# Patient Record
Sex: MALE | Employment: FULL TIME | URBAN - METROPOLITAN AREA
[De-identification: names, ages, dates, MRNs, and addresses within clinical notes are randomized per-mention and may not be internally consistent; named-entity substitution may affect disease eponyms.]

---

## 2022-07-28 ENCOUNTER — HOSPITAL ENCOUNTER (OUTPATIENT)
Dept: MRI IMAGING | Age: 67
Discharge: HOME | End: 2022-07-30
Payer: COMMERCIAL

## 2022-07-28 DIAGNOSIS — M75.40 SUBACROMIAL IMPINGEMENT, UNSPECIFIED LATERALITY: ICD-10-CM

## 2022-07-28 PROCEDURE — 73221 MRI JOINT UPR EXTREM W/O DYE: CPT

## 2023-09-08 ENCOUNTER — APPOINTMENT (OUTPATIENT)
Dept: PRIMARY CARE | Facility: CLINIC | Age: 68
End: 2023-09-08
Payer: MEDICARE

## 2023-10-13 ENCOUNTER — APPOINTMENT (OUTPATIENT)
Dept: RADIOLOGY | Facility: HOSPITAL | Age: 68
End: 2023-10-13
Payer: MEDICARE

## 2023-10-13 ENCOUNTER — HOSPITAL ENCOUNTER (EMERGENCY)
Facility: HOSPITAL | Age: 68
Discharge: HOME | End: 2023-10-13
Attending: EMERGENCY MEDICINE
Payer: MEDICARE

## 2023-10-13 ENCOUNTER — OFFICE VISIT (OUTPATIENT)
Dept: PRIMARY CARE | Facility: CLINIC | Age: 68
End: 2023-10-13
Payer: MEDICARE

## 2023-10-13 VITALS
TEMPERATURE: 97 F | WEIGHT: 196 LBS | SYSTOLIC BLOOD PRESSURE: 142 MMHG | RESPIRATION RATE: 18 BRPM | BODY MASS INDEX: 22.68 KG/M2 | OXYGEN SATURATION: 98 % | HEIGHT: 78 IN | HEART RATE: 78 BPM | DIASTOLIC BLOOD PRESSURE: 86 MMHG

## 2023-10-13 VITALS
WEIGHT: 196 LBS | DIASTOLIC BLOOD PRESSURE: 50 MMHG | BODY MASS INDEX: 22.68 KG/M2 | TEMPERATURE: 96.6 F | HEART RATE: 66 BPM | HEIGHT: 78 IN | OXYGEN SATURATION: 98 % | SYSTOLIC BLOOD PRESSURE: 120 MMHG | RESPIRATION RATE: 18 BRPM

## 2023-10-13 DIAGNOSIS — K62.5 BRIGHT RED BLOOD PER RECTUM: Primary | ICD-10-CM

## 2023-10-13 DIAGNOSIS — I71.40 ABDOMINAL AORTIC ANEURYSM (AAA) WITHOUT RUPTURE, UNSPECIFIED PART (CMS-HCC): ICD-10-CM

## 2023-10-13 DIAGNOSIS — Z12.12 SCREENING FOR COLORECTAL CANCER: ICD-10-CM

## 2023-10-13 DIAGNOSIS — Z00.00 ROUTINE GENERAL MEDICAL EXAMINATION AT HEALTH CARE FACILITY: Primary | ICD-10-CM

## 2023-10-13 DIAGNOSIS — Z12.11 SCREENING FOR COLORECTAL CANCER: ICD-10-CM

## 2023-10-13 DIAGNOSIS — Z12.5 SCREENING FOR PROSTATE CANCER: ICD-10-CM

## 2023-10-13 DIAGNOSIS — K62.5 GASTROINTESTINAL HEMORRHAGE ASSOCIATED WITH ANORECTAL SOURCE: ICD-10-CM

## 2023-10-13 DIAGNOSIS — I10 ESSENTIAL HYPERTENSION: ICD-10-CM

## 2023-10-13 PROBLEM — K27.9 PEPTIC ULCER: Status: ACTIVE | Noted: 2023-10-13

## 2023-10-13 PROBLEM — G43.909 MIGRAINE HEADACHE: Status: ACTIVE | Noted: 2023-10-13

## 2023-10-13 PROBLEM — H43.811 POSTERIOR VITREOUS DETACHMENT OF RIGHT EYE: Status: ACTIVE | Noted: 2017-02-24

## 2023-10-13 PROBLEM — H25.10 CATARACT, NUCLEAR SCLEROTIC SENILE: Status: ACTIVE | Noted: 2017-02-24

## 2023-10-13 PROBLEM — K08.409 H/O WISDOM TOOTH EXTRACTION: Status: ACTIVE | Noted: 2023-10-13

## 2023-10-13 PROBLEM — M25.562 KNEE PAIN, LEFT: Status: ACTIVE | Noted: 2023-10-13

## 2023-10-13 PROBLEM — K21.9 GASTROESOPHAGEAL REFLUX DISEASE: Status: ACTIVE | Noted: 2023-10-13

## 2023-10-13 LAB
ALBUMIN SERPL BCP-MCNC: 4.2 G/DL (ref 3.4–5)
ALP SERPL-CCNC: 36 U/L (ref 33–136)
ALT SERPL W P-5'-P-CCNC: 14 U/L (ref 10–52)
ANION GAP SERPL CALC-SCNC: 9 MMOL/L (ref 10–20)
APPEARANCE UR: CLEAR
APTT PPP: 28 SECONDS (ref 27–38)
AST SERPL W P-5'-P-CCNC: 15 U/L (ref 9–39)
BASOPHILS # BLD AUTO: 0.02 X10*3/UL (ref 0–0.1)
BASOPHILS NFR BLD AUTO: 0.4 %
BILIRUB SERPL-MCNC: 1.9 MG/DL (ref 0–1.2)
BILIRUB UR STRIP.AUTO-MCNC: NEGATIVE MG/DL
BUN SERPL-MCNC: 22 MG/DL (ref 6–23)
CALCIUM SERPL-MCNC: 9.5 MG/DL (ref 8.6–10.3)
CARDIAC TROPONIN I PNL SERPL HS: 3 NG/L (ref 0–20)
CHLORIDE SERPL-SCNC: 102 MMOL/L (ref 98–107)
CO2 SERPL-SCNC: 30 MMOL/L (ref 21–32)
COLOR UR: YELLOW
CREAT SERPL-MCNC: 1 MG/DL (ref 0.5–1.3)
EOSINOPHIL # BLD AUTO: 0.02 X10*3/UL (ref 0–0.7)
EOSINOPHIL NFR BLD AUTO: 0.4 %
ERYTHROCYTE [DISTWIDTH] IN BLOOD BY AUTOMATED COUNT: 12.5 % (ref 11.5–14.5)
GFR SERPL CREATININE-BSD FRML MDRD: 82 ML/MIN/1.73M*2
GLUCOSE SERPL-MCNC: 111 MG/DL (ref 74–99)
GLUCOSE UR STRIP.AUTO-MCNC: NEGATIVE MG/DL
HCT VFR BLD AUTO: 40.8 % (ref 41–52)
HGB BLD-MCNC: 13.9 G/DL (ref 13.5–17.5)
HOLD SPECIMEN: NORMAL
HOLD SPECIMEN: NORMAL
IMM GRANULOCYTES # BLD AUTO: 0.01 X10*3/UL (ref 0–0.7)
IMM GRANULOCYTES NFR BLD AUTO: 0.2 % (ref 0–0.9)
INR PPP: 1.1 (ref 0.9–1.1)
KETONES UR STRIP.AUTO-MCNC: NEGATIVE MG/DL
LACTATE SERPL-SCNC: 1 MMOL/L (ref 0.4–2)
LEUKOCYTE ESTERASE UR QL STRIP.AUTO: NEGATIVE
LIPASE SERPL-CCNC: 24 U/L (ref 9–82)
LYMPHOCYTES # BLD AUTO: 0.64 X10*3/UL (ref 1.2–4.8)
LYMPHOCYTES NFR BLD AUTO: 11.3 %
MCH RBC QN AUTO: 31 PG (ref 26–34)
MCHC RBC AUTO-ENTMCNC: 34.1 G/DL (ref 32–36)
MCV RBC AUTO: 91 FL (ref 80–100)
MONOCYTES # BLD AUTO: 0.42 X10*3/UL (ref 0.1–1)
MONOCYTES NFR BLD AUTO: 7.4 %
NEUTROPHILS # BLD AUTO: 4.54 X10*3/UL (ref 1.2–7.7)
NEUTROPHILS NFR BLD AUTO: 80.3 %
NITRITE UR QL STRIP.AUTO: NEGATIVE
NRBC BLD-RTO: 0 /100 WBCS (ref 0–0)
PH UR STRIP.AUTO: 6 [PH]
PLATELET # BLD AUTO: 203 X10*3/UL (ref 150–450)
PMV BLD AUTO: 8.8 FL (ref 7.5–11.5)
POTASSIUM SERPL-SCNC: 4.2 MMOL/L (ref 3.5–5.3)
PROT SERPL-MCNC: 6.6 G/DL (ref 6.4–8.2)
PROT UR STRIP.AUTO-MCNC: NEGATIVE MG/DL
PROTHROMBIN TIME: 12.5 SECONDS (ref 9.8–12.8)
RBC # BLD AUTO: 4.49 X10*6/UL (ref 4.5–5.9)
RBC # UR STRIP.AUTO: NEGATIVE /UL
SODIUM SERPL-SCNC: 137 MMOL/L (ref 136–145)
SP GR UR STRIP.AUTO: 1.02
UROBILINOGEN UR STRIP.AUTO-MCNC: <2 MG/DL
WBC # BLD AUTO: 5.7 X10*3/UL (ref 4.4–11.3)

## 2023-10-13 PROCEDURE — 85025 COMPLETE CBC W/AUTO DIFF WBC: CPT | Performed by: EMERGENCY MEDICINE

## 2023-10-13 PROCEDURE — 74177 CT ABD & PELVIS W/CONTRAST: CPT | Mod: ME

## 2023-10-13 PROCEDURE — G0402 INITIAL PREVENTIVE EXAM: HCPCS | Performed by: FAMILY MEDICINE

## 2023-10-13 PROCEDURE — 83605 ASSAY OF LACTIC ACID: CPT | Performed by: EMERGENCY MEDICINE

## 2023-10-13 PROCEDURE — 3074F SYST BP LT 130 MM HG: CPT | Performed by: FAMILY MEDICINE

## 2023-10-13 PROCEDURE — 96361 HYDRATE IV INFUSION ADD-ON: CPT

## 2023-10-13 PROCEDURE — 99284 EMERGENCY DEPT VISIT MOD MDM: CPT | Mod: 25

## 2023-10-13 PROCEDURE — 2500000004 HC RX 250 GENERAL PHARMACY W/ HCPCS (ALT 636 FOR OP/ED): Performed by: EMERGENCY MEDICINE

## 2023-10-13 PROCEDURE — 80053 COMPREHEN METABOLIC PANEL: CPT | Performed by: EMERGENCY MEDICINE

## 2023-10-13 PROCEDURE — 1036F TOBACCO NON-USER: CPT | Performed by: FAMILY MEDICINE

## 2023-10-13 PROCEDURE — 71045 X-RAY EXAM CHEST 1 VIEW: CPT | Mod: FY

## 2023-10-13 PROCEDURE — 96360 HYDRATION IV INFUSION INIT: CPT

## 2023-10-13 PROCEDURE — 71045 X-RAY EXAM CHEST 1 VIEW: CPT | Performed by: RADIOLOGY

## 2023-10-13 PROCEDURE — 1125F AMNT PAIN NOTED PAIN PRSNT: CPT | Performed by: FAMILY MEDICINE

## 2023-10-13 PROCEDURE — 85730 THROMBOPLASTIN TIME PARTIAL: CPT | Performed by: EMERGENCY MEDICINE

## 2023-10-13 PROCEDURE — 99214 OFFICE O/P EST MOD 30 MIN: CPT | Performed by: FAMILY MEDICINE

## 2023-10-13 PROCEDURE — 1170F FXNL STATUS ASSESSED: CPT | Performed by: FAMILY MEDICINE

## 2023-10-13 PROCEDURE — 85610 PROTHROMBIN TIME: CPT | Performed by: EMERGENCY MEDICINE

## 2023-10-13 PROCEDURE — 2550000001 HC RX 255 CONTRASTS: Performed by: EMERGENCY MEDICINE

## 2023-10-13 PROCEDURE — 81003 URINALYSIS AUTO W/O SCOPE: CPT | Performed by: EMERGENCY MEDICINE

## 2023-10-13 PROCEDURE — 1159F MED LIST DOCD IN RCRD: CPT | Performed by: FAMILY MEDICINE

## 2023-10-13 PROCEDURE — 83690 ASSAY OF LIPASE: CPT | Performed by: EMERGENCY MEDICINE

## 2023-10-13 PROCEDURE — 36415 COLL VENOUS BLD VENIPUNCTURE: CPT | Performed by: EMERGENCY MEDICINE

## 2023-10-13 PROCEDURE — 1160F RVW MEDS BY RX/DR IN RCRD: CPT | Performed by: FAMILY MEDICINE

## 2023-10-13 PROCEDURE — 74177 CT ABD & PELVIS W/CONTRAST: CPT | Performed by: RADIOLOGY

## 2023-10-13 PROCEDURE — 3078F DIAST BP <80 MM HG: CPT | Performed by: FAMILY MEDICINE

## 2023-10-13 PROCEDURE — 84484 ASSAY OF TROPONIN QUANT: CPT | Performed by: EMERGENCY MEDICINE

## 2023-10-13 RX ORDER — BISOPROLOL FUMARATE AND HYDROCHLOROTHIAZIDE 2.5; 6.25 MG/1; MG/1
1 TABLET ORAL DAILY
Qty: 90 TABLET | Refills: 3 | Status: SHIPPED | OUTPATIENT
Start: 2023-10-13 | End: 2024-02-13 | Stop reason: SDUPTHER

## 2023-10-13 RX ORDER — BISOPROLOL FUMARATE AND HYDROCHLOROTHIAZIDE 2.5; 6.25 MG/1; MG/1
1 TABLET ORAL DAILY
COMMUNITY
End: 2023-10-13 | Stop reason: SDUPTHER

## 2023-10-13 RX ORDER — OMEPRAZOLE 20 MG/1
20 CAPSULE, DELAYED RELEASE ORAL DAILY
COMMUNITY

## 2023-10-13 RX ADMIN — IOHEXOL 75 ML: 350 INJECTION, SOLUTION INTRAVENOUS at 14:15

## 2023-10-13 RX ADMIN — SODIUM CHLORIDE 500 ML: 9 INJECTION, SOLUTION INTRAVENOUS at 12:24

## 2023-10-13 ASSESSMENT — PATIENT HEALTH QUESTIONNAIRE - PHQ9
2. FEELING DOWN, DEPRESSED OR HOPELESS: NOT AT ALL
1. LITTLE INTEREST OR PLEASURE IN DOING THINGS: NOT AT ALL
SUM OF ALL RESPONSES TO PHQ9 QUESTIONS 1 AND 2: 0
SUM OF ALL RESPONSES TO PHQ9 QUESTIONS 1 AND 2: 0
2. FEELING DOWN, DEPRESSED OR HOPELESS: NOT AT ALL
1. LITTLE INTEREST OR PLEASURE IN DOING THINGS: NOT AT ALL

## 2023-10-13 ASSESSMENT — ENCOUNTER SYMPTOMS
FATIGUE: 1
NECK PAIN: 0
HEADACHES: 0
ARTHRALGIAS: 0
SWOLLEN GLANDS: 0
NAUSEA: 0
CHILLS: 0
DEPRESSION: 0
LOSS OF SENSATION IN FEET: 0
OCCASIONAL FEELINGS OF UNSTEADINESS: 0
SORE THROAT: 0
MYALGIAS: 0
DIAPHORESIS: 0
ANOREXIA: 0
HEMATOCHEZIA: 1
CHANGE IN BOWEL HABIT: 1
FEVER: 0
ABDOMINAL PAIN: 0
JOINT SWELLING: 0

## 2023-10-13 ASSESSMENT — ACTIVITIES OF DAILY LIVING (ADL)
GROCERY_SHOPPING: INDEPENDENT
DRESSING: INDEPENDENT
TAKING_MEDICATION: INDEPENDENT
DOING_HOUSEWORK: INDEPENDENT
BATHING: INDEPENDENT
MANAGING_FINANCES: INDEPENDENT

## 2023-10-13 ASSESSMENT — PAIN - FUNCTIONAL ASSESSMENT
PAIN_FUNCTIONAL_ASSESSMENT: 0-10
PAIN_FUNCTIONAL_ASSESSMENT: 0-10

## 2023-10-13 ASSESSMENT — PAIN SCALES - GENERAL
PAINLEVEL_OUTOF10: 0 - NO PAIN
PAINLEVEL_OUTOF10: 0 - NO PAIN
PAINLEVEL_OUTOF10: 2

## 2023-10-13 ASSESSMENT — LIFESTYLE VARIABLES
REASON UNABLE TO ASSESS: NO
HAVE YOU EVER FELT YOU SHOULD CUT DOWN ON YOUR DRINKING: NO
HAVE PEOPLE ANNOYED YOU BY CRITICIZING YOUR DRINKING: NO
EVER HAD A DRINK FIRST THING IN THE MORNING TO STEADY YOUR NERVES TO GET RID OF A HANGOVER: NO
EVER FELT BAD OR GUILTY ABOUT YOUR DRINKING: NO

## 2023-10-13 NOTE — PATIENT INSTRUCTIONS
Please consider exercise program involving walking or some other form of aerobic activity 5 days weekly for 30 minutes... L !    Discussed medication side effects.  The  risk benefits and treatment options  discussed with patient.       Please schedule follow-up appointment based upon your improvement/failure to improve/chronic medical conditions and physician recommendations during office appointment at the .       Patient advised to go to er if symptoms worsen or to call answering service, or to return to office for additional evaluation    This note was partially  generated using Dragon voice recognition and there may be incorrect words, wording, spelling, or pronunciation errors that were not corrected prior to committing the note to the medical record.

## 2023-10-13 NOTE — PROGRESS NOTES
"Subjective   Patient ID: Darrel Trujillo is a 67 y.o. male who presents for Medicare Annual Wellness Visit Subsequent (Last few weeks has had some bleeding with BM. Blood in toilet. ).    Rectal Bleeding  This is a new problem. The current episode started in the past 7 days. The problem occurs constantly. Associated symptoms include a change in bowel habit and fatigue. Pertinent negatives include no abdominal pain, anorexia, arthralgias, chest pain, chills, diaphoresis, fever, headaches, joint swelling, myalgias, nausea, neck pain, rash, sore throat, swollen glands or urinary symptoms. Nothing aggravates the symptoms.    Patient is also here for follow-up of hypertension.. Symptoms do not include headache confusion visual disturbance dizziness shortness of breath chest pain syncope or palpitations. Recent blood pressure patient has  been checking.120/60.. By report there is good compliance with treatment. Pertinent medical history includes aortic  aneurysm  Sometimes with  bending over  is  lightheaded   Review of Systems   Constitutional:  Positive for fatigue. Negative for chills, diaphoresis and fever.   HENT:  Negative for sore throat.    Cardiovascular:  Negative for chest pain.   Gastrointestinal:  Positive for change in bowel habit and hematochezia. Negative for abdominal pain, anorexia and nausea.   Musculoskeletal:  Negative for arthralgias, joint swelling, myalgias and neck pain.   Skin:  Negative for rash.   Neurological:  Negative for headaches.       Objective   /50   Pulse 66   Temp 35.9 °C (96.6 °F)   Resp 18   Ht 2.007 m (6' 7\")   Wt 88.9 kg (196 lb)   SpO2 98%   BMI 22.08 kg/m²     Physical Exam  general: alert oriented x three  HEENT hearing normal to voice  Neck supple  Lungs respirations non-labored.  Cardiovascular: no peripheral edema  Skin: warm and dry without rash  Psych: judgement and insight normal  Abdomen  soft   bs  plus   no tenderness   Musculoskeletal:  ambulation " normal,    lymph:negative cervical  LYMPADENOPATHY  thyroid: non palpable enlargement      Dx: Abdominal aortic aneurysm, without rupture, unspecified (CMS/HCC) [I71.40 (ICD-10-CM)]     PACS Images     Show images for VASCULAR HISTORICAL PROCEDURES  Interpretation Summary    Interpreted By:  DEVYN OCHOA MD  MRN: [id number]  Patient Name: ALFREDO MARIE     STUDY:  US ABD AORTA; ;  3/20/2023 7:58 am     INDICATION:  PAIN  I71.40: Abdominal aortic aneurysm (AAA) without rupture.     COMPARISON:  03/10/2022     ACCESSION NUMBER(S):  [id number]     ORDERING CLINICIAN:  SHANNA DILLON     TECHNIQUE:  Transabdominal grayscale, color and spectral Doppler ultrasound  evaluation of the abdominal aorta and bilateral common iliac arteries.     FINDINGS:  Proximal and mid abdominal aorta measures 3.1 cm and 2.9 cm  respectively in greatest cross-sectional dimension were able to be  seen.     Infrarenal abdominal aortic aneurysm measuring 3.8 cm today versus  3.6 cm on the previous study.     Bilateral common iliac aneurysms measuring 1.8 cm on the right and  1.7 cm on the left are also stable.     IMPRESSION:  Stable appearance of infrarenal abdominal aortic and bilateral common  iliac aneurysms since 03/10/2022.  Assessment/Plan   Problem List Items Addressed This Visit             ICD-10-CM    Essential hypertension I10     Appears stable blood pressure without abnormalities and meds seem to help         Abdominal aortic aneurysm (AAA) without rupture (CMS/HCC) I71.40     Note and in light of the increase in size we will repeat ultrasound         Gastrointestinal hemorrhage associated with anorectal source K62.5     Patient has acute GI bleed describes as 1 cup in the toilet on several occasions discussed and explained need to go to the emergency room if lightheaded or dizzy and rapid heartbeat.  We will go ahead and try to arrange for outpatient work-up at this time if unable to accomplish advised needs to go to ER.           Other Visit Diagnoses         Codes    Routine general medical examination at health care [facility]    -  Primary Z00.00    Screening for colorectal cancer     Z12.11, Z12.12    Relevant Orders    Colonoscopy Screening    Screening for prostate cancer     Z12.5    Relevant Orders    Prostate Specific Antigen, Screen        After discussion patient became slightly clammy and we elected to transfer to ER for evaluation and work-up and call the ER and discussed        S1-S2

## 2023-10-13 NOTE — ED PROVIDER NOTES
"67-year-old male presents emergency department with chief complaint of bright red blood per rectum x1 week.  Patient was sent in by his primary care provider who he saw today for a general checkup.  He states that while he saw his primary care doctor and he was told that he likely needs a colonoscopy this caused him to feel lightheaded and sweaty.  Patient states that he believes this was just due to anxiety at the time.  He denies any fevers, coughing, or congestion.  He denies chest pain or difficulty breathing.  He states that he is otherwise not felt lightheaded this week.  Patient reports that he has been having bright red blood in the toilet with bowel movements every morning since Monday, but it has been clearing up throughout the day until yesterday he had the symptoms throughout the day.  He denies any pain with bowel movements.  He denies nausea or vomiting.  No dysuria or hematuria.  Patient states he is not on any blood thinning medications.  He denies any known issues with hemorrhoids.  Patient denies any significant abdominal pain, but reports he did have some dull abdominal aching yesterday that has since resolved.  He does report previous history of bleeding ulcers and states he believes that he had a colonoscopy and EGD for this \"several years ago \".  He denies any regular alcohol use. Chart review shows significant past medical history for hypertension, GERD, abdominal aortic aneurysm, peptic ulcer, and cataracts.      History provided by:  Patient   used: No           ------------------------------------------------------------------------------------------------------------------------------------------    VS: As documented in the triage note and EMR flowsheet from this visit were reviewed.    Review of Systems  Constitutional: no fever, chills reports episode of diaphoresis when he was feeling anxious  Eyes: no redness, discharge, pain  HENT: no sore throat, nose bleeds, " congestion, rhinorrhea   Cardiovascular: no chest pain, leg edema, palpitations  Respiratory: no shortness of breath, cough, wheezing  GI: no nausea, diarrhea, vomiting, constipation,  melena reports bright red blood per rectum.  States he had some abdominal pain yesterday, none currently.  : no dysuria, frequency, hematuria  Musculoskeletal: no neck pain, stiffness,  no joint deformity, swelling  Skin: no rash, erythema, wounds  Neurological: no headache, dizziness, weakness, numbness, or tingling reports reports feeling lightheaded prior to arrival  Psychiatric: no suicidal thoughts, confusion, agitation  Metabolic: no polyuria or polydipsia  Hematologic: no increased bleeding or bruising  Immunology: No immunocompromise state    [facility]  Nursing notes reviewed and confirmed by me.  Chart review performed including medications, allergies, and medical, surgical, and family history  Visit Vitals  /86 (BP Location: Left arm, Patient Position: Sitting)   Pulse 78   Temp 36.1 °C (97 °F) (Temporal)   Resp 18     Physical Exam  Vitals and nursing note reviewed.   Constitutional:       General: He is not in acute distress.     Appearance: Normal appearance. He is not ill-appearing.   HENT:      Head: Normocephalic and atraumatic.      Nose: No congestion or rhinorrhea.      Mouth/Throat:      Mouth: Mucous membranes are moist.      Pharynx: No oropharyngeal exudate or posterior oropharyngeal erythema.   Eyes:      Extraocular Movements: Extraocular movements intact.      Conjunctiva/sclera: Conjunctivae normal.      Pupils: Pupils are equal, round, and reactive to light.   Cardiovascular:      Rate and Rhythm: Normal rate and regular rhythm.      Pulses: Normal pulses.      Heart sounds: Normal heart sounds.   Pulmonary:      Effort: Pulmonary effort is normal. No respiratory distress.      Breath sounds: Normal breath sounds. No stridor. No wheezing, rhonchi or rales.   Abdominal:      General: There is no  distension.      Palpations: Abdomen is soft.      Tenderness: There is no abdominal tenderness. There is no guarding or rebound.   Musculoskeletal:         General: No swelling or deformity. Normal range of motion.      Cervical back: Normal range of motion and neck supple. No rigidity.      Right lower leg: No edema.      Left lower leg: No edema.      Comments: No calf tenderness    Skin:     General: Skin is warm and dry.      Capillary Refill: Capillary refill takes less than 2 seconds.      Coloration: Skin is not jaundiced.      Findings: No rash.   Neurological:      General: No focal deficit present.      Mental Status: He is alert and oriented to person, place, and time.      Sensory: No sensory deficit.      Motor: No weakness.   Psychiatric:         Mood and Affect: Mood normal.         Behavior: Behavior normal.        No past medical history on file.   No past surgical history on file.   Social History     Socioeconomic History    Marital status:      Spouse name: Not on file    Number of children: Not on file    Years of education: Not on file    Highest education level: Not on file   Occupational History    Not on file   Tobacco Use    Smoking status: Former     Types: Cigarettes     Passive exposure: Past    Smokeless tobacco: Never   Substance and Sexual Activity    Alcohol use: Not Currently    Drug use: Not on file    Sexual activity: Not on file   Other Topics Concern    Not on file   Social History Narrative    Not on file     Social Determinants of Health     Financial Resource Strain: Not on file   Food Insecurity: Not on file   Transportation Needs: Not on file   Physical Activity: Not on file   Stress: Not on file   Social Connections: Not on file   Intimate Partner Violence: Not on file   Housing Stability: Not on file      ------------------------------------------------------------------------------------------------------------------------------------------  CT abdomen pelvis w  IV contrast   Final Result   1.  No acute intra-abdominal process.             Signed by: Lonny Lyle 10/13/2023 2:41 PM   Dictation workstation:   [id number]      XR chest 1 view   Final Result   1.  No evidence of acute cardiopulmonary process.             Signed by: Nilo Castelan 10/13/2023 12:18 PM   Dictation workstation:   [id number]         Labs Reviewed   CBC WITH AUTO DIFFERENTIAL - Abnormal       Result Value    WBC 5.7      nRBC 0.0      RBC 4.49 (*)     Hemoglobin 13.9      Hematocrit 40.8 (*)     MCV 91      MCH 31.0      MCHC 34.1      RDW 12.5      Platelets 203      MPV 8.8      Neutrophils % 80.3      Immature Granulocytes %, Automated 0.2      Lymphocytes % 11.3      Monocytes % 7.4      Eosinophils % 0.4      Basophils % 0.4      Neutrophils Absolute 4.54      Immature Granulocytes Absolute, Automated 0.01      Lymphocytes Absolute 0.64 (*)     Monocytes Absolute 0.42      Eosinophils Absolute 0.02      Basophils Absolute 0.02     COMPREHENSIVE METABOLIC PANEL - Abnormal    Glucose 111 (*)     Sodium 137      Potassium 4.2      Chloride 102      Bicarbonate 30      Anion Gap 9 (*)     Urea Nitrogen 22      Creatinine 1.00      eGFR 82      Calcium 9.5      Albumin 4.2      Alkaline Phosphatase 36      Total Protein 6.6      AST 15      Bilirubin, Total 1.9 (*)     ALT 14     LIPASE - Normal    Lipase 24      Narrative:     Venipuncture immediately after or during the administration of Metamizole may lead to falsely low results. Testing should be performed immediately prior to Metamizole dosing.   APTT - Normal    aPTT 28      Narrative:     The APTT is no longer used for monitoring Unfractionated Heparin Therapy. For monitoring Heparin Therapy, use the Heparin Assay.   PROTIME-INR - Normal    Protime 12.5      INR 1.1     LACTATE - Normal    Lactate 1.0      Narrative:     Venipuncture immediately after or during the administration of Metamizole may lead to falsely low results. Testing should  be performed immediately  prior to Metamizole dosing.   TROPONIN I, HIGH SENSITIVITY - Normal    Troponin I, High Sensitivity 3      Narrative:     Less than 99th percentile of normal range cutoff-  Female and children under 18 years old <14 ng/L; Male <21 ng/L: Negative  Repeat testing should be performed if clinically indicated.     Female and children under 18 years old 14-50 ng/L; Male 21-50 ng/L:  Consistent with possible cardiac damage and possible increased clinical   risk. Serial measurements may help to assess extent of myocardial damage.     >50 ng/L: Consistent with cardiac damage, increased clinical risk and  myocardial infarction. Serial measurements may help assess extent of   myocardial damage.      NOTE: Children less than 1 year old may have higher baseline troponin   levels and results should be interpreted in conjunction with the overall   clinical context.     NOTE: Troponin I testing is performed using a different   testing methodology at [facility] than at other   [facility]. Direct result comparisons should only   be made within the same method.   URINALYSIS WITH REFLEX MICROSCOPIC AND CULTURE - Normal    Color, Urine Yellow      Appearance, Urine Clear      Specific Gravity, Urine 1.019      pH, Urine 6.0      Protein, Urine NEGATIVE      Glucose, Urine NEGATIVE      Blood, Urine NEGATIVE      Ketones, Urine NEGATIVE      Bilirubin, Urine NEGATIVE      Urobilinogen, Urine <2.0      Nitrite, Urine NEGATIVE      Leukocyte Esterase, Urine NEGATIVE     URINALYSIS WITH REFLEX MICROSCOPIC AND CULTURE    Narrative:     The following orders were created for panel order Urinalysis with Reflex Microscopic and Culture.  Procedure                               Abnormality         Status                     ---------                               -----------         ------                     Urinalysis with Reflex M...[[id number]]  Normal              Final result               Extra  Urine Gray Tube[[id number]]                            Final result                 Please view results for these tests on the individual orders.   EXTRA URINE GRAY TUBE    Extra Tube Hold for add-ons.          Medical Decision Making  EKG interpreted by ED physician: Sinus bradycardia rate of 59.  DC, QRS, QTc intervals are within normal limits.  No significant ST elevations or depressions.  Q waves present in septal leads may represent previous infarct.  Good R wave progression.  Normal axis.  Q waves in septal leads do appear new when compared to February 2013.    67-year-old male presents emergency department with complaints of bright red blood per rectum x1 week.  Patient was sent in by his primary care provider as they were concerned he may need emergent colonoscopy.  I did speak with the patient's primary care doctor who was concerned he needed colonoscopy and also reported that the patient has an abdominal aortic aneurysm.  On my exam the patient is afebrile and nontoxic.  His abdomen is soft without rebound, rigidity, guarding, or distention.  His abdomen is nontender.  He does report some abdominal soreness yesterday, but no tenderness on my exam.  I did repeat abdominal exams during his stay and abdomen continued to be benign.  Given his presenting complaints a thorough work-up was obtained.  UA did not show obvious findings of infection.  Cardiac enzyme and EKG did not show acute ACS or significant arrhythmia.  No findings concerning for demand ischemia.  Lactate within normal range I do not suspect ischemic cause of his symptoms.  CBC does not show significant leukocytosis or anemia.  Patient does not have findings of sepsis.  Patient does not have severe anemia though I did discuss with him that this is a dynamic thing and if he develops symptoms such as shortness of breath, lightheadedness, significant increase in bloody stools, or abdominal pain he should be reevaluated including his blood count.   Lipase within normal range I do not suspect pancreatitis.  CMP does not show any significant metabolic abnormalities.  BUN within normal range making upper GI bleed unlikely.  INR within normal range and patient is not on anticoagulants.  CT abdomen pelvis does not show any acute intra-abdominal process such as mass, diverticulitis, or other intra-abdominal infection.  Of note it reports that the abdominal aorta is without aneurysmal dilation.  Chest x-ray showed no acute cardiopulmonary process.  I discussed with patient and family close outpatient follow-up with GI I do feel the patient likely requires colonoscopy or further evaluation.  He is agreeable with this plan.  I also gave patient's strict return precautions he is comfortable returning home.  I did reach out to  on-call for GI and spoke with his ASH who states that the office will reach out to the patient on Monday for close outpatient follow-up.  Advised patient and family that I would be consulting GI.       Diagnoses as of 10/13/23 1835   Bright red blood per rectum      1. Bright red blood per rectum           Procedures          Gerald Hartmann, DO  10/13/23 1835

## 2023-10-13 NOTE — ASSESSMENT & PLAN NOTE
Patient has acute GI bleed describes as 1 cup in the toilet on several occasions discussed and explained need to go to the emergency room if lightheaded or dizzy and rapid heartbeat.  We will go ahead and try to arrange for outpatient work-up at this time if unable to accomplish advised needs to go to ER.

## 2023-10-17 ENCOUNTER — ANESTHESIA EVENT (OUTPATIENT)
Dept: GASTROENTEROLOGY | Facility: EXTERNAL LOCATION | Age: 68
End: 2023-10-17

## 2023-10-18 PROBLEM — Z04.9 CONDITION NOT FOUND: Status: ACTIVE | Noted: 2022-10-15

## 2023-10-18 PROBLEM — H02.831 DERMATOCHALASIS OF BOTH UPPER EYELIDS: Status: ACTIVE | Noted: 2017-02-24

## 2023-10-18 PROBLEM — R06.09 DYSPNEA ON MINIMAL EXERTION: Status: ACTIVE | Noted: 2023-10-18

## 2023-10-18 PROBLEM — H02.834 DERMATOCHALASIS OF BOTH UPPER EYELIDS: Status: ACTIVE | Noted: 2017-02-24

## 2023-10-18 PROBLEM — R19.00 PULSATILE ABDOMINAL MASS: Status: ACTIVE | Noted: 2023-10-18

## 2023-10-18 PROBLEM — R35.1 NOCTURIA: Status: ACTIVE | Noted: 2023-10-18

## 2023-10-18 PROBLEM — M25.819 SHOULDER IMPINGEMENT: Status: ACTIVE | Noted: 2023-10-18

## 2023-10-19 ENCOUNTER — HOSPITAL ENCOUNTER (OUTPATIENT)
Dept: GASTROENTEROLOGY | Facility: EXTERNAL LOCATION | Age: 68
Discharge: HOME | End: 2023-10-19
Payer: MEDICARE

## 2023-10-19 ENCOUNTER — ANESTHESIA (OUTPATIENT)
Dept: GASTROENTEROLOGY | Facility: EXTERNAL LOCATION | Age: 68
End: 2023-10-19

## 2023-10-19 VITALS
TEMPERATURE: 98.1 F | DIASTOLIC BLOOD PRESSURE: 65 MMHG | OXYGEN SATURATION: 100 % | SYSTOLIC BLOOD PRESSURE: 117 MMHG | RESPIRATION RATE: 12 BRPM | HEART RATE: 62 BPM

## 2023-10-19 DIAGNOSIS — Z12.12 SCREENING FOR COLORECTAL CANCER: ICD-10-CM

## 2023-10-19 DIAGNOSIS — K62.5 RECTAL BLEEDING: ICD-10-CM

## 2023-10-19 DIAGNOSIS — Z12.11 SCREENING FOR COLORECTAL CANCER: ICD-10-CM

## 2023-10-19 PROCEDURE — 88305 TISSUE EXAM BY PATHOLOGIST: CPT | Mod: TC | Performed by: STUDENT IN AN ORGANIZED HEALTH CARE EDUCATION/TRAINING PROGRAM

## 2023-10-19 PROCEDURE — 88305 TISSUE EXAM BY PATHOLOGIST: CPT | Mod: TC,SUR | Performed by: STUDENT IN AN ORGANIZED HEALTH CARE EDUCATION/TRAINING PROGRAM

## 2023-10-19 PROCEDURE — 45385 COLONOSCOPY W/LESION REMOVAL: CPT | Performed by: STUDENT IN AN ORGANIZED HEALTH CARE EDUCATION/TRAINING PROGRAM

## 2023-10-19 PROCEDURE — 88305 TISSUE EXAM BY PATHOLOGIST: CPT | Performed by: PATHOLOGY

## 2023-10-19 RX ORDER — PROPOFOL 10 MG/ML
INJECTION, EMULSION INTRAVENOUS AS NEEDED
Status: DISCONTINUED | OUTPATIENT
Start: 2023-10-19 | End: 2023-10-19

## 2023-10-19 RX ORDER — LIDOCAINE HYDROCHLORIDE 20 MG/ML
INJECTION, SOLUTION INFILTRATION; PERINEURAL AS NEEDED
Status: DISCONTINUED | OUTPATIENT
Start: 2023-10-19 | End: 2023-10-19

## 2023-10-19 RX ORDER — SODIUM CHLORIDE 9 MG/ML
20 INJECTION, SOLUTION INTRAVENOUS CONTINUOUS
Status: DISCONTINUED | OUTPATIENT
Start: 2023-10-19 | End: 2023-10-20 | Stop reason: HOSPADM

## 2023-10-19 RX ADMIN — PROPOFOL 50 MG: 10 INJECTION, EMULSION INTRAVENOUS at 08:17

## 2023-10-19 RX ADMIN — PROPOFOL 50 MG: 10 INJECTION, EMULSION INTRAVENOUS at 08:12

## 2023-10-19 RX ADMIN — LIDOCAINE HYDROCHLORIDE 50 MG: 20 INJECTION, SOLUTION INFILTRATION; PERINEURAL at 07:53

## 2023-10-19 RX ADMIN — SODIUM CHLORIDE: 9 INJECTION, SOLUTION INTRAVENOUS at 07:53

## 2023-10-19 RX ADMIN — PROPOFOL 50 MG: 10 INJECTION, EMULSION INTRAVENOUS at 08:06

## 2023-10-19 RX ADMIN — PROPOFOL 100 MG: 10 INJECTION, EMULSION INTRAVENOUS at 08:00

## 2023-10-19 RX ADMIN — PROPOFOL 100 MG: 10 INJECTION, EMULSION INTRAVENOUS at 07:53

## 2023-10-19 SDOH — HEALTH STABILITY: MENTAL HEALTH: CURRENT SMOKER: 0

## 2023-10-19 ASSESSMENT — PAIN - FUNCTIONAL ASSESSMENT
PAIN_FUNCTIONAL_ASSESSMENT: 0-10

## 2023-10-19 ASSESSMENT — PAIN SCALES - GENERAL
PAIN_LEVEL: 0
PAINLEVEL_OUTOF10: 0 - NO PAIN

## 2023-10-19 NOTE — H&P
Outpatient Hospital Procedure H&P    Patient Profile-Procedures  Name Darrel Trujillo  Date of Birth 1955  MRN [id number]  Address   [address] [place] [address] [id number] [place] [id number]    Primary Phone Number [phone]  Secondary Phone Number    PCP Juancho Kemp    Procedure(s):  Colonoscopy  Primary contact name and number   Extended Emergency Contact Information  Primary Emergency Contact: Cassie Trujillo  Home Phone: [phone]  Relation: Spouse    General Health  Weight There were no vitals filed for this visit.  BMI There is no height or weight on file to calculate BMI.    Allergies  No Known Allergies    Past Medical History   Past Medical History:   Diagnosis Date    GERD (gastroesophageal reflux disease)     Hypertension        Provider assessment  Diagnosis: Rectal bleeding.  Medication Reviewed - yes  Prior to Admission medications    Medication Sig Start Date End Date Taking? Authorizing Provider   bisoproloL-hydrochlorothiazide (Ziac) 2.5-6.25 mg tablet Take 1 tablet by mouth once daily. 10/13/23  Yes Juancho Kemp, DO   omeprazole (PriLOSEC) 20 mg DR capsule Take 1 capsule (20 mg) by mouth once daily.   Yes Historical Provider, MD   bisoproloL-hydrochlorothiazide (Ziac) 2.5-6.25 mg tablet Take 1 tablet by mouth once daily.  10/13/23  Historical Provider, MD       Physical Exam  Vitals:    10/19/23 0736   BP: 139/81   Pulse: 80   Resp: 16   Temp: 36.4 °C (97.5 °F)        General: A&Ox3, NAD.  HEENT: AT/NC.   CV: RRR. No murmur.  Resp: CTA bilaterally. No wheezing, rhonchi or rales.   GI: Soft, NT/ND. BSx4.  Extrem: No edema. Pulses intact.  Skin: No Jaundice.   Neuro: No focal deficits.   Psych: Normal mood and affect.        Oropharyngeal Classification I (soft palate, uvula, fauces, and tonsillar pillars visible)  ASA PS Classification 3  Sedation Plan: Deep Sedation.  Procedure Plan - pre-procedural (re)assesment completed by physician:  discharge/transfer patient when  discharge criteria met    Justin Brown DO  10/19/2023 7:43 AM

## 2023-10-19 NOTE — DISCHARGE INSTRUCTIONS
Patient Instructions Post Procedure      The anesthetics, sedatives or narcotics which were given to you today will be acting in your body for the next 24 hours, so you might feel a little sleepy or groggy.  This feeling should slowly wear off. Carefully read and follow the instructions.     You received sedation today:  - Do not drive or operate any machinery or power tools of any kind.   - No alcoholic beverages today, not even beer or wine.  - Do not make any important decisions or sign any legal documents.  - No over the counter medications that contain alcohol or that may cause drowsiness.    While it is common to experience mild to moderate abdominal distention, gas, or belching after your procedure, if any of these symptoms occur following discharge from the GI Lab or within one week of having your procedure, call the [facility] to be advised whether a visit to your nearest Urgent Care or Emergency Department is indicated.  Take this paper with you if you go.   - If you develop an allergic reaction to the medications that were given during your procedure such as difficulty breathing, rash, hives, severe nausea, vomiting or lightheadedness.  - If you experience chest pain, shortness of breath, severe abdominal pain, fevers and chills.  -If you develop signs and symptoms of bleeding such as blood in your spit, if your stools turn black, tarry, or bloody  - If you have not urinated within 8 hours following your procedure.  - If your IV site becomes painful, red, inflamed, or looks infected.    If you received a biopsy/polypectomy/sphincterotomy the following instructions apply below:  __ Do not use Aspirin containing products, non-steroidal medications or anti-coagulants for one week following your procedure. (Examples of these types of medications are: Advil, Arthrotec, Aleve, Coumadin, Ecotrin, Heparin, Ibuprofen, Indocin, Motrin, Naprosyn, Nuprin, Plavix, Vioxx, and Voltarin, or their generic  forms.  This list is not all-inclusive.  Check with your physician or pharmacist before resuming medications.)   __ Eat a soft diet today.  Avoid foods that are poorly digested for the next 24 hours.  These foods would include: nuts, beans, lettuce, red meats, and fried foods. Start with liquids and advance your diet as tolerated, gradually work up to eating solids.   __ Do not have a Barium Study or Enema for one week.    Your physician recommends the additional following instructions:    -You have a contact number available for emergencies. The signs and symptoms of potential delayed complications were discussed with you. You may return to normal activities tomorrow.  -Resume your previous diet or other if specified.  -Continue your present medications.   -We are waiting for your pathology results, if applicable.  -The findings and recommendations have been discussed with you and/or family.  - Please see Medication Reconciliation Form for new medication/medications prescribed.     If you experience any problems or have any questions following discharge from the GI Lab, please call: [phone] from 7 am- 4:30 pm.  In the event of an emergency please go to the closest Emergency Department or call Dr. Brown at [phone]

## 2023-10-19 NOTE — ANESTHESIA PREPROCEDURE EVALUATION
Patient: Darrel Trujillo    Procedure Information       Date/Time: 10/19/23 0800    Scheduled providers: Justin Brown DO; Farhana Vinson RN    Procedure: COLONOSCOPY    Location: [facility]            Relevant Problems   Cardiovascular   (+) Abdominal aortic aneurysm (AAA) without rupture (CMS/HCC)   (+) Essential hypertension      GI   (+) Gastroesophageal reflux disease   (+) Gastrointestinal hemorrhage associated with anorectal source   (+) Peptic ulcer      Pulmonary   (+) Dyspnea on minimal exertion       Clinical information reviewed:   Tobacco  Allergies  Meds   Med Hx  Surg Hx   Fam Hx  Soc Hx        NPO Detail:  No data recorded     Physical Exam    Airway  Mallampati: II  TM distance: >3 FB  Neck ROM: full     Cardiovascular - normal exam     Dental - normal exam  (+) lower dentures, upper dentures     Pulmonary - normal exam  Breath sounds clear to auscultation     Abdominal            Anesthesia Plan    ASA 3     MAC     The patient is not a current smoker.    intravenous induction   Anesthetic plan and risks discussed with patient.  Use of blood products discussed with patient who.    Plan discussed with CRNA.

## 2023-10-19 NOTE — ANESTHESIA POSTPROCEDURE EVALUATION
Patient: Darrel Trujillo    Procedure Summary       Date: 10/19/23 Room / Location: [facility]    Anesthesia Start: 0753 Anesthesia Stop:     Procedure: COLONOSCOPY Diagnosis: Rectal bleeding    Scheduled Providers: Justin Brown DO; Farhana Vinson RN Responsible Provider: PAULA Vo    Anesthesia Type: MAC ASA Status: 3            Anesthesia Type: MAC    Vitals Value Taken Time   /53 10/19/23 0823   Temp 36.7 °C (98.1 °F) 10/19/23 0823   Pulse 73 10/19/23 0823   Resp 12 10/19/23 0823   SpO2 99 % 10/19/23 0823       Anesthesia Post Evaluation    Patient location during evaluation: bedside  Patient participation: complete - patient cannot participate  Level of consciousness: awake and responsive to verbal stimuli  Pain score: 0  Pain management: adequate  Airway patency: patent  Cardiovascular status: acceptable and hemodynamically stable  Respiratory status: acceptable  Hydration status: acceptable        No notable events documented.

## 2023-10-28 ENCOUNTER — HOSPITAL ENCOUNTER (OUTPATIENT)
Dept: CARDIOLOGY | Facility: HOSPITAL | Age: 68
Discharge: HOME | End: 2023-10-28
Payer: MEDICARE

## 2023-10-28 PROCEDURE — 93005 ELECTROCARDIOGRAM TRACING: CPT

## 2023-10-30 LAB
LABORATORY COMMENT REPORT: NORMAL
PATH REPORT.FINAL DX SPEC: NORMAL
PATH REPORT.GROSS SPEC: NORMAL
PATH REPORT.MICROSCOPIC SPEC OTHER STN: NORMAL
PATH REPORT.RELEVANT HX SPEC: NORMAL
PATH REPORT.TOTAL CANCER: NORMAL

## 2024-02-12 ENCOUNTER — TELEPHONE (OUTPATIENT)
Dept: PRIMARY CARE | Facility: CLINIC | Age: 69
End: 2024-02-12
Payer: MEDICARE

## 2024-02-12 NOTE — TELEPHONE ENCOUNTER
Rx Refill Request Telephone Encounter    Name:  Darrel Trujillo  :  [id number]  Medication Name: Bisoprolol-hydrochlorothiazide 2.5-6.25mg  Dose : 1 tablet  Route : oral  Frequency : daily  Specific Pharmacy location:  [facility]

## 2024-02-13 DIAGNOSIS — I10 ESSENTIAL HYPERTENSION: ICD-10-CM

## 2024-02-13 RX ORDER — BISOPROLOL FUMARATE AND HYDROCHLOROTHIAZIDE 2.5; 6.25 MG/1; MG/1
1 TABLET ORAL DAILY
Qty: 90 TABLET | Refills: 3 | Status: SHIPPED | OUTPATIENT
Start: 2024-02-13

## 2024-10-08 ENCOUNTER — APPOINTMENT (OUTPATIENT)
Dept: PRIMARY CARE | Facility: CLINIC | Age: 69
End: 2024-10-08
Payer: MEDICARE

## 2024-10-08 ENCOUNTER — TELEPHONE (OUTPATIENT)
Dept: GASTROENTEROLOGY | Facility: CLINIC | Age: 69
End: 2024-10-08

## 2024-10-08 VITALS
OXYGEN SATURATION: 98 % | HEART RATE: 50 BPM | HEIGHT: 78 IN | RESPIRATION RATE: 18 BRPM | TEMPERATURE: 97 F | WEIGHT: 198 LBS | SYSTOLIC BLOOD PRESSURE: 126 MMHG | BODY MASS INDEX: 22.91 KG/M2 | DIASTOLIC BLOOD PRESSURE: 80 MMHG

## 2024-10-08 DIAGNOSIS — Z87.891 PERSONAL HISTORY OF TOBACCO USE, PRESENTING HAZARDS TO HEALTH: ICD-10-CM

## 2024-10-08 DIAGNOSIS — R63.8 OTHER SYMPTOMS AND SIGNS CONCERNING FOOD AND FLUID INTAKE: ICD-10-CM

## 2024-10-08 DIAGNOSIS — I10 ESSENTIAL HYPERTENSION: ICD-10-CM

## 2024-10-08 DIAGNOSIS — R19.00 PULSATILE ABDOMINAL MASS: ICD-10-CM

## 2024-10-08 DIAGNOSIS — Z00.00 ROUTINE GENERAL MEDICAL EXAMINATION AT HEALTH CARE FACILITY: Primary | ICD-10-CM

## 2024-10-08 DIAGNOSIS — R53.82 CHRONIC FATIGUE: ICD-10-CM

## 2024-10-08 DIAGNOSIS — Z12.5 SCREENING FOR PROSTATE CANCER: ICD-10-CM

## 2024-10-08 DIAGNOSIS — K21.9 GASTROESOPHAGEAL REFLUX DISEASE, UNSPECIFIED WHETHER ESOPHAGITIS PRESENT: ICD-10-CM

## 2024-10-08 DIAGNOSIS — I71.40 ABDOMINAL AORTIC ANEURYSM (AAA) WITHOUT RUPTURE, UNSPECIFIED PART (CMS-HCC): ICD-10-CM

## 2024-10-08 PROBLEM — U07.1 DISEASE DUE TO SEVERE ACUTE RESPIRATORY SYNDROME CORONAVIRUS 2 (SARS-COV-2): Status: ACTIVE | Noted: 2024-10-08

## 2024-10-08 PROBLEM — Z20.822 EXPOSURE TO SEVERE ACUTE RESPIRATORY SYNDROME CORONAVIRUS 2 (SARS-COV-2): Status: ACTIVE | Noted: 2024-10-08

## 2024-10-08 PROCEDURE — 1160F RVW MEDS BY RX/DR IN RCRD: CPT | Performed by: FAMILY MEDICINE

## 2024-10-08 PROCEDURE — 3008F BODY MASS INDEX DOCD: CPT | Performed by: FAMILY MEDICINE

## 2024-10-08 PROCEDURE — 1159F MED LIST DOCD IN RCRD: CPT | Performed by: FAMILY MEDICINE

## 2024-10-08 PROCEDURE — 1123F ACP DISCUSS/DSCN MKR DOCD: CPT | Performed by: FAMILY MEDICINE

## 2024-10-08 PROCEDURE — G0439 PPPS, SUBSEQ VISIT: HCPCS | Performed by: FAMILY MEDICINE

## 2024-10-08 PROCEDURE — 99214 OFFICE O/P EST MOD 30 MIN: CPT | Performed by: FAMILY MEDICINE

## 2024-10-08 PROCEDURE — 1170F FXNL STATUS ASSESSED: CPT | Performed by: FAMILY MEDICINE

## 2024-10-08 PROCEDURE — 3074F SYST BP LT 130 MM HG: CPT | Performed by: FAMILY MEDICINE

## 2024-10-08 PROCEDURE — 3078F DIAST BP <80 MM HG: CPT | Performed by: FAMILY MEDICINE

## 2024-10-08 PROCEDURE — 1036F TOBACCO NON-USER: CPT | Performed by: FAMILY MEDICINE

## 2024-10-08 RX ORDER — BISOPROLOL FUMARATE AND HYDROCHLOROTHIAZIDE 2.5; 6.25 MG/1; MG/1
1 TABLET ORAL DAILY
Qty: 90 TABLET | Refills: 3 | Status: SHIPPED | OUTPATIENT
Start: 2024-10-08

## 2024-10-08 ASSESSMENT — ENCOUNTER SYMPTOMS
SINUS PRESSURE: 0
ARTHRALGIAS: 1
DEPRESSION: 0
ABDOMINAL PAIN: 0
LOSS OF SENSATION IN FEET: 0
SHORTNESS OF BREATH: 0
CHEST TIGHTNESS: 0
HEMATURIA: 0
STRIDOR: 0
OCCASIONAL FEELINGS OF UNSTEADINESS: 0
COUGH: 1
DYSURIA: 0
BLOOD IN STOOL: 0
FATIGUE: 1
BACK PAIN: 0
HEADACHES: 1

## 2024-10-08 ASSESSMENT — ACTIVITIES OF DAILY LIVING (ADL)
MANAGING_FINANCES: INDEPENDENT
TAKING_MEDICATION: INDEPENDENT
DOING_HOUSEWORK: INDEPENDENT
GROCERY_SHOPPING: INDEPENDENT
BATHING: INDEPENDENT
DRESSING: INDEPENDENT

## 2024-10-08 ASSESSMENT — PATIENT HEALTH QUESTIONNAIRE - PHQ9
1. LITTLE INTEREST OR PLEASURE IN DOING THINGS: NOT AT ALL
2. FEELING DOWN, DEPRESSED OR HOPELESS: NOT AT ALL
SUM OF ALL RESPONSES TO PHQ9 QUESTIONS 1 AND 2: 0

## 2024-10-08 NOTE — PATIENT INSTRUCTIONS

## 2024-10-08 NOTE — PROGRESS NOTES
Subjective   Darrel Trujillo is a 68 y.o. male who is here for a routine exam/Medicare Annual    Active Problem List      Comprehensive Medical/Surgical/Social/Family History  Past Medical History:   Diagnosis Date    GERD (gastroesophageal reflux disease)     Hypertension      History reviewed. No pertinent surgical history.  Social History     Social History Narrative    Not on file       Allergies and Medications  Patient has no known allergies.  Current Outpatient Medications on File Prior to Visit   Medication Sig Dispense Refill    omeprazole (PriLOSEC) 20 mg DR capsule Take 1 capsule (20 mg) by mouth once daily.      [DISCONTINUED] bisoproloL-hydrochlorothiazide (Ziac) 2.5-6.25 mg tablet Take 1 tablet by mouth once daily. 90 tablet 3     No current facility-administered medications on file prior to visit.       Concerns today:  ??  Aneurysm  getting  larger..  On  pain     .. Felt  like someone was poking me ... No  pain  ..     Nervous  .. Did  go away ..  Not feeling pulsation  in abdomen    ???  Low blood pressure  Patient is also here for follow-up of hypertension.. Symptoms do not include headache confusion visual disturbance dizziness shortness of breath chest pain syncope or palpitations. Recent blood pressure patient has   been checking.. 120/50... By report there is good compliance with treatment. Pertinent medical history includes.. arthritis of shoulder    The reflux esophagitis he has been occurring in a recurrent pattern for years. The course has been without change. The reflux is described as   moderate. The patient remains compliant on meds.. The patient takes medications in a daily fashion.   .sometimes  hard time swallowing Denies  hoarseness or odynophagia...   Lifestyle  Diet: Could be improved  Physical Activity: Not on file      Tobacco Use: Medium Risk (10/8/2024)    Patient History     Smoking Tobacco Use: Former     Smokeless Tobacco Use: Never     Passive Exposure: Past      Alcohol  "Use: Not on file      Depression: Not at risk (10/8/2024)    PHQ-2     PHQ-2 Score: 0      Stress: Not on file         Consultants:  Patient Care Team:  Juancho Kemp DO as PCP - General  Juancho Kemp DO as PCP [facility]     Colorectal Screening:   colonoscopy  Date: 10/19/23    Nocturia: .Present  Erectile dysfunction: .Did not discuss    Review of Systems   Constitutional:  Positive for fatigue.   HENT:  Negative for sinus pressure.    Respiratory:  Positive for cough. Negative for chest tightness, shortness of breath and stridor.    Cardiovascular:  Negative for chest pain.   Gastrointestinal:  Negative for abdominal pain and blood in stool.   Genitourinary:  Negative for dysuria, hematuria, scrotal swelling and testicular pain.   Musculoskeletal:  Positive for arthralgias. Negative for back pain.   Neurological:  Positive for headaches. Negative for syncope.       Objective   /80   Pulse 50   Temp 36.1 °C (97 °F)   Resp 18   Ht 2.032 m (6' 8\")   Wt 89.8 kg (198 lb)   SpO2 98%   BMI 21.75 kg/m²     Physical Exam  Vitals reviewed.   Constitutional:       Appearance: Normal appearance.   HENT:      Head: Normocephalic.      Right Ear: External ear normal.      Left Ear: External ear normal.      Nose: Nose normal.      Mouth/Throat:      Mouth: Mucous membranes are moist.   Eyes:      Conjunctiva/sclera: Conjunctivae normal.   Cardiovascular:      Rate and Rhythm: Regular rhythm.      Heart sounds: Normal heart sounds.   Pulmonary:      Effort: Pulmonary effort is normal.      Breath sounds: Normal breath sounds.   Abdominal:      General: Bowel sounds are normal.      Palpations: Abdomen is soft.   Musculoskeletal:         General: Normal range of motion.      Cervical back: Neck supple.   Skin:     General: Skin is warm and dry.   Neurological:      General: No focal deficit present.      Mental Status: He is alert and oriented to person, place, and time.   Psychiatric:    "      Behavior: Behavior normal.         Judgment: Judgment normal.     CT abdomen pelvis w IV contrast  Status: Final result     PACS Images     Show images for CT abdomen pelvis w IV contrast  Signed by    Signed Time Phone Pager   Lonny Lyle MD 10/13/2023 14:41 [phone]     Exam Information    Status Exam Begun Exam Ended   Final 10/13/2023 13:57 10/13/2023 14:13     Study Result    Narrative & Impression   Interpreted By:  Lonny Lyle,   STUDY:  CT ABDOMEN PELVIS W IV CONTRAST;  10/13/2023 2:13 pm      INDICATION:  Signs/Symptoms:abdominal pain, lower GI bleeding.      COMPARISON:  None.      ACCESSION NUMBER(S):  [id number]      ORDERING CLINICIAN:  MADELAINE GONZÁLES      TECHNIQUE:  Contiguous axial images were obtained through the abdomen and pelvis  after the administration of  75 mL Omnipaque 350 intravenous  contrast. Coronal and sagittal reformations were made.      FINDINGS:  LOWER CHEST:  Lung bases are clear.      ABDOMEN:      LIVER:  Within normal limits. Small cyst in segment IV.      BILE DUCTS:  Nondilated.      GALLBLADDER:  The gallbladder is not distended and without calcified stones.      PANCREAS:  Within normal limits.      SPLEEN:  Within normal limits.      ADRENAL GLANDS:  Within normal limits.      KIDNEYS, URETERS, and BLADDER:  The kidneys enhance symmetrically without focal lesion.  No  hydroureteronephrosis bilaterally.Bladder unremarkable.      VESSELS:  There is no aneurysmal dilatation of the abdominal aorta. The IVC is  within normal limits.      BOWEL:  There is no bowel obstruction or appreciable bowel wall thickening.  Appendix not discretely identified.      No focal diverticular disease.      PERITONEUM/RETROPERITONEUM/LYMPH NODES:  No ascites or free air, no fluid collection.      No retroperitoneal fluid collection or lymphadenopathy.      REPRODUCTIVE ORGANS:  Prostatic calcifications.      ABDOMINAL WALL:  Unremarkable.      BONE AND SOFT TISSUE:  Bones are  intact.      IMPRESSION:  1.  No acute intra-abdominal process.          Signed by: Lonny Lyle 10/13/2023 2:41 PM  Dictation workstation:   [id number]       Assessment/Plan   Problem List Items Addressed This Visit       Essential hypertension     Patient is also here for follow-up of hypertension.. Symptoms do not include headache confusion visual disturbance dizziness shortness of breath chest pain syncope or palpitations. Recent blood pressure patient has   been checking.. 120/50... By report there is good compliance with treatment. Pertinent medical history includes.. arthritis of shoulder ..stable and continue meds             Relevant Medications    bisoproloL-hydrochlorothiazide (Ziac) 2.5-6.25 mg tablet    Other Relevant Orders    Comprehensive Metabolic Panel    Gastroesophageal reflux disease      The reflux esophagitis he has been occurring in a recurrent pattern for years. The course has been without change. The reflux is described as   moderate. The patient remains compliant on meds.. The patient takes medications in a daily fashion.   .sometimes  hard time swallowing Denies  hoarseness or odynophagia... ??  Pain  with swallowing  lets  get  egd          Relevant Orders    CBC and Auto Differential    Vitamin B12    Esophagogastroduodenoscopy (EGD)    Abdominal aortic aneurysm (AAA) without rupture (CMS-HCC)    Pulsatile abdominal mass     Repeat sonogram           Other Visit Diagnoses       Routine general medical examination at health care facility    -  Primary    Relevant Orders    1 Year Follow Up In [facility] - PCP - Wellness Exam    Personal history of tobacco use, presenting hazards to health        Relevant Orders    AAA Screening, Vascular US Abdominal Aorta    Screening for prostate cancer        Relevant Orders    PSA screen    Chronic fatigue        Relevant Orders    Iron level    Other symptoms and signs concerning food and fluid intake        Relevant Orders    Iron level           Reviewed Social Determinants of health with patient, discussed healthy lifestyle including 150 minutes of physical activity per week  Ordered/Reviewed baseline labwork -CBC, CMP, Lipid Panel  Immunizations:  refused all  Colonoscopy 2023    Health Counseling

## 2024-10-08 NOTE — ASSESSMENT & PLAN NOTE
Patient is also here for follow-up of hypertension.. Symptoms do not include headache confusion visual disturbance dizziness shortness of breath chest pain syncope or palpitations. Recent blood pressure patient has   been checking.. 120/50... By report there is good compliance with treatment. Pertinent medical history includes.. arthritis of shoulder ..stable and continue meds

## 2024-10-08 NOTE — ASSESSMENT & PLAN NOTE
The reflux esophagitis he has been occurring in a recurrent pattern for years. The course has been without change. The reflux is described as   moderate. The patient remains compliant on meds.. The patient takes medications in a daily fashion.   .sometimes  hard time swallowing Denies  hoarseness or odynophagia... ??  Pain  with swallowing  lets  get  egd

## 2024-10-28 ENCOUNTER — ANESTHESIA EVENT (OUTPATIENT)
Dept: GASTROENTEROLOGY | Facility: EXTERNAL LOCATION | Age: 69
End: 2024-10-28

## 2024-10-30 ENCOUNTER — HOSPITAL ENCOUNTER (OUTPATIENT)
Dept: RADIOLOGY | Facility: CLINIC | Age: 69
Discharge: HOME | End: 2024-10-30
Payer: MEDICARE

## 2024-10-30 DIAGNOSIS — Z87.891 PERSONAL HISTORY OF TOBACCO USE, PRESENTING HAZARDS TO HEALTH: ICD-10-CM

## 2024-10-30 PROCEDURE — 76706 US ABDL AORTA SCREEN AAA: CPT | Performed by: STUDENT IN AN ORGANIZED HEALTH CARE EDUCATION/TRAINING PROGRAM

## 2024-10-30 PROCEDURE — 76706 US ABDL AORTA SCREEN AAA: CPT

## 2024-10-31 ENCOUNTER — ANESTHESIA (OUTPATIENT)
Dept: GASTROENTEROLOGY | Facility: EXTERNAL LOCATION | Age: 69
End: 2024-10-31

## 2024-10-31 ENCOUNTER — APPOINTMENT (OUTPATIENT)
Dept: GASTROENTEROLOGY | Facility: EXTERNAL LOCATION | Age: 69
End: 2024-10-31
Payer: MEDICARE

## 2024-10-31 VITALS
HEART RATE: 62 BPM | RESPIRATION RATE: 13 BRPM | OXYGEN SATURATION: 8 % | BODY MASS INDEX: 22.56 KG/M2 | HEIGHT: 78 IN | DIASTOLIC BLOOD PRESSURE: 67 MMHG | TEMPERATURE: 98.2 F | WEIGHT: 195 LBS | SYSTOLIC BLOOD PRESSURE: 118 MMHG

## 2024-10-31 DIAGNOSIS — K21.9 GASTROESOPHAGEAL REFLUX DISEASE, UNSPECIFIED WHETHER ESOPHAGITIS PRESENT: Primary | ICD-10-CM

## 2024-10-31 PROBLEM — Z86.69 HISTORY OF MIGRAINE HEADACHES: Status: ACTIVE | Noted: 2024-10-31

## 2024-10-31 PROCEDURE — 88305 TISSUE EXAM BY PATHOLOGIST: CPT | Performed by: PATHOLOGY

## 2024-10-31 PROCEDURE — 88305 TISSUE EXAM BY PATHOLOGIST: CPT | Mod: TC,ELYLAB | Performed by: INTERNAL MEDICINE

## 2024-10-31 PROCEDURE — 43239 EGD BIOPSY SINGLE/MULTIPLE: CPT | Performed by: INTERNAL MEDICINE

## 2024-10-31 RX ORDER — LIDOCAINE HYDROCHLORIDE 20 MG/ML
INJECTION, SOLUTION INFILTRATION; PERINEURAL AS NEEDED
Status: DISCONTINUED | OUTPATIENT
Start: 2024-10-31 | End: 2024-10-31

## 2024-10-31 RX ORDER — PROPOFOL 10 MG/ML
INJECTION, EMULSION INTRAVENOUS AS NEEDED
Status: DISCONTINUED | OUTPATIENT
Start: 2024-10-31 | End: 2024-10-31

## 2024-10-31 SDOH — HEALTH STABILITY: MENTAL HEALTH: CURRENT SMOKER: 0

## 2024-10-31 ASSESSMENT — PAIN - FUNCTIONAL ASSESSMENT
PAIN_FUNCTIONAL_ASSESSMENT: 0-10

## 2024-10-31 ASSESSMENT — PAIN SCALES - GENERAL
PAINLEVEL_OUTOF10: 0 - NO PAIN
PAINLEVEL_OUTOF10: 0 - NO PAIN
PAIN_LEVEL: 0
PAINLEVEL_OUTOF10: 0 - NO PAIN
PAINLEVEL_OUTOF10: 0 - NO PAIN

## 2024-10-31 ASSESSMENT — COLUMBIA-SUICIDE SEVERITY RATING SCALE - C-SSRS
2. HAVE YOU ACTUALLY HAD ANY THOUGHTS OF KILLING YOURSELF?: NO
6. HAVE YOU EVER DONE ANYTHING, STARTED TO DO ANYTHING, OR PREPARED TO DO ANYTHING TO END YOUR LIFE?: NO
1. IN THE PAST MONTH, HAVE YOU WISHED YOU WERE DEAD OR WISHED YOU COULD GO TO SLEEP AND NOT WAKE UP?: NO

## 2024-11-06 LAB
LABORATORY COMMENT REPORT: NORMAL
PATH REPORT.FINAL DX SPEC: NORMAL
PATH REPORT.GROSS SPEC: NORMAL
PATH REPORT.RELEVANT HX SPEC: NORMAL
PATH REPORT.TOTAL CANCER: NORMAL

## 2024-11-07 ENCOUNTER — TELEPHONE (OUTPATIENT)
Dept: GASTROENTEROLOGY | Facility: EXTERNAL LOCATION | Age: 69
End: 2024-11-07
Payer: MEDICARE

## 2024-12-18 ENCOUNTER — LAB (OUTPATIENT)
Dept: LAB | Facility: LAB | Age: 69
End: 2024-12-18
Payer: MEDICARE

## 2024-12-18 DIAGNOSIS — K21.9 GASTROESOPHAGEAL REFLUX DISEASE, UNSPECIFIED WHETHER ESOPHAGITIS PRESENT: ICD-10-CM

## 2024-12-18 DIAGNOSIS — R53.82 CHRONIC FATIGUE: ICD-10-CM

## 2024-12-18 DIAGNOSIS — I10 ESSENTIAL HYPERTENSION: ICD-10-CM

## 2024-12-18 DIAGNOSIS — R63.8 OTHER SYMPTOMS AND SIGNS CONCERNING FOOD AND FLUID INTAKE: ICD-10-CM

## 2024-12-18 DIAGNOSIS — Z12.5 SCREENING FOR PROSTATE CANCER: ICD-10-CM

## 2024-12-18 LAB
ALBUMIN SERPL BCP-MCNC: 4.3 G/DL (ref 3.4–5)
ALP SERPL-CCNC: 44 U/L (ref 33–136)
ALT SERPL W P-5'-P-CCNC: 14 U/L (ref 10–52)
ANION GAP SERPL CALC-SCNC: 11 MMOL/L (ref 10–20)
AST SERPL W P-5'-P-CCNC: 17 U/L (ref 9–39)
BASOPHILS # BLD AUTO: 0.02 X10*3/UL (ref 0–0.1)
BASOPHILS NFR BLD AUTO: 0.4 %
BILIRUB SERPL-MCNC: 2 MG/DL (ref 0–1.2)
BUN SERPL-MCNC: 23 MG/DL (ref 6–23)
CALCIUM SERPL-MCNC: 9.6 MG/DL (ref 8.6–10.3)
CHLORIDE SERPL-SCNC: 103 MMOL/L (ref 98–107)
CHOLEST SERPL-MCNC: 225 MG/DL (ref 0–199)
CHOLESTEROL/HDL RATIO: 3
CO2 SERPL-SCNC: 30 MMOL/L (ref 21–32)
CREAT SERPL-MCNC: 1.01 MG/DL (ref 0.5–1.3)
EGFRCR SERPLBLD CKD-EPI 2021: 80 ML/MIN/1.73M*2
EOSINOPHIL # BLD AUTO: 0.08 X10*3/UL (ref 0–0.7)
EOSINOPHIL NFR BLD AUTO: 1.6 %
ERYTHROCYTE [DISTWIDTH] IN BLOOD BY AUTOMATED COUNT: 12.4 % (ref 11.5–14.5)
GLUCOSE SERPL-MCNC: 83 MG/DL (ref 74–99)
HCT VFR BLD AUTO: 42.7 % (ref 41–52)
HDLC SERPL-MCNC: 75.1 MG/DL
HGB BLD-MCNC: 14.1 G/DL (ref 13.5–17.5)
IMM GRANULOCYTES # BLD AUTO: 0.02 X10*3/UL (ref 0–0.7)
IMM GRANULOCYTES NFR BLD AUTO: 0.4 % (ref 0–0.9)
IRON SERPL-MCNC: 96 UG/DL (ref 35–150)
LDLC SERPL CALC-MCNC: 135 MG/DL
LYMPHOCYTES # BLD AUTO: 1.32 X10*3/UL (ref 1.2–4.8)
LYMPHOCYTES NFR BLD AUTO: 26.1 %
MCH RBC QN AUTO: 29.8 PG (ref 26–34)
MCHC RBC AUTO-ENTMCNC: 33 G/DL (ref 32–36)
MCV RBC AUTO: 90 FL (ref 80–100)
MONOCYTES # BLD AUTO: 0.56 X10*3/UL (ref 0.1–1)
MONOCYTES NFR BLD AUTO: 11.1 %
NEUTROPHILS # BLD AUTO: 3.05 X10*3/UL (ref 1.2–7.7)
NEUTROPHILS NFR BLD AUTO: 60.4 %
NON HDL CHOLESTEROL: 150 MG/DL (ref 0–149)
NRBC BLD-RTO: 0 /100 WBCS (ref 0–0)
PLATELET # BLD AUTO: 253 X10*3/UL (ref 150–450)
POTASSIUM SERPL-SCNC: 4.5 MMOL/L (ref 3.5–5.3)
PROT SERPL-MCNC: 6.8 G/DL (ref 6.4–8.2)
PSA SERPL-MCNC: 15.12 NG/ML
RBC # BLD AUTO: 4.73 X10*6/UL (ref 4.5–5.9)
SODIUM SERPL-SCNC: 139 MMOL/L (ref 136–145)
TRIGL SERPL-MCNC: 75 MG/DL (ref 0–149)
VIT B12 SERPL-MCNC: 618 PG/ML (ref 211–911)
VLDL: 15 MG/DL (ref 0–40)
WBC # BLD AUTO: 5.1 X10*3/UL (ref 4.4–11.3)

## 2024-12-18 PROCEDURE — 85025 COMPLETE CBC W/AUTO DIFF WBC: CPT

## 2024-12-18 PROCEDURE — 83540 ASSAY OF IRON: CPT

## 2024-12-18 PROCEDURE — 36415 COLL VENOUS BLD VENIPUNCTURE: CPT

## 2024-12-18 PROCEDURE — 80053 COMPREHEN METABOLIC PANEL: CPT

## 2024-12-18 PROCEDURE — 80061 LIPID PANEL: CPT

## 2024-12-18 PROCEDURE — 82607 VITAMIN B-12: CPT

## 2024-12-18 PROCEDURE — G0103 PSA SCREENING: HCPCS

## 2024-12-20 DIAGNOSIS — R97.20 ELEVATED PSA: Primary | ICD-10-CM

## 2025-01-08 ENCOUNTER — APPOINTMENT (OUTPATIENT)
Dept: PRIMARY CARE | Facility: CLINIC | Age: 70
End: 2025-01-08
Payer: MEDICARE

## 2025-01-08 VITALS
DIASTOLIC BLOOD PRESSURE: 87 MMHG | SYSTOLIC BLOOD PRESSURE: 137 MMHG | HEART RATE: 66 BPM | RESPIRATION RATE: 18 BRPM | HEIGHT: 78 IN | WEIGHT: 202 LBS | OXYGEN SATURATION: 99 % | TEMPERATURE: 97 F | BODY MASS INDEX: 23.37 KG/M2

## 2025-01-08 DIAGNOSIS — K21.00 GASTROESOPHAGEAL REFLUX DISEASE WITH ESOPHAGITIS, UNSPECIFIED WHETHER HEMORRHAGE: Primary | ICD-10-CM

## 2025-01-08 DIAGNOSIS — I71.43 INFRARENAL ABDOMINAL AORTIC ANEURYSM (AAA) WITHOUT RUPTURE (CMS-HCC): ICD-10-CM

## 2025-01-08 DIAGNOSIS — I10 ESSENTIAL HYPERTENSION: ICD-10-CM

## 2025-01-08 DIAGNOSIS — R97.20 ELEVATED PSA, BETWEEN 10 AND LESS THAN 20 NG/ML: ICD-10-CM

## 2025-01-08 PROCEDURE — 1123F ACP DISCUSS/DSCN MKR DOCD: CPT | Performed by: FAMILY MEDICINE

## 2025-01-08 PROCEDURE — 1159F MED LIST DOCD IN RCRD: CPT | Performed by: FAMILY MEDICINE

## 2025-01-08 PROCEDURE — 1036F TOBACCO NON-USER: CPT | Performed by: FAMILY MEDICINE

## 2025-01-08 PROCEDURE — 3075F SYST BP GE 130 - 139MM HG: CPT | Performed by: FAMILY MEDICINE

## 2025-01-08 PROCEDURE — 3079F DIAST BP 80-89 MM HG: CPT | Performed by: FAMILY MEDICINE

## 2025-01-08 PROCEDURE — 99214 OFFICE O/P EST MOD 30 MIN: CPT | Performed by: FAMILY MEDICINE

## 2025-01-08 PROCEDURE — 3008F BODY MASS INDEX DOCD: CPT | Performed by: FAMILY MEDICINE

## 2025-01-08 ASSESSMENT — ENCOUNTER SYMPTOMS
DIFFICULTY URINATING: 0
HEMATURIA: 0
BACK PAIN: 1
FREQUENCY: 1
ARTHRALGIAS: 1
ABDOMINAL PAIN: 0

## 2025-01-08 ASSESSMENT — PATIENT HEALTH QUESTIONNAIRE - PHQ9
1. LITTLE INTEREST OR PLEASURE IN DOING THINGS: NOT AT ALL
SUM OF ALL RESPONSES TO PHQ9 QUESTIONS 1 AND 2: 0
2. FEELING DOWN, DEPRESSED OR HOPELESS: NOT AT ALL

## 2025-01-08 NOTE — PROGRESS NOTES
Subjective   Patient ID: Darrel Trujillo is a 69 y.o. male who presents for Follow-up.  HPI  Patient is here for follow-up from last visit.  According to last visit question was his aneurysm getting larger.  Felt like someone was poking me.  No feeling of pulsation in the abdomen    In addition question low blood pressure.  He had no blood pressure elevation symptoms i.e. headache confusion dizziness but his blood pressure with checking was 120/50.  Date of last visit was 10/8/2024  Review labs   Review of Systems   Gastrointestinal:  Negative for abdominal pain.   Genitourinary:  Positive for frequency. Negative for difficulty urinating and hematuria.   Musculoskeletal:  Positive for arthralgias and back pain.       Objective   Physical Exam  Vitals: I have reviewed the vitals  General: Well-developed.  In no acute distress.  Eyes:   sclera nonicteric.  Conjunctiva not injected.  No discharge.   HEAD: Normocephalic, atraumatic.  HEENT   Mucous membranes moist.  Posterior oropharynx nonerythematous, no tonsillar exudates.      No cervical lymphadenopathy.  Cardio: Regular rate and rhythm.  No murmur, rub or gallop.  Pulmonary: Lungs clear to auscultation in all fields.  No accessory muscle use.  GI/: Normal active bowel sounds.  Soft, nontender.  No masses or organomegaly appreciated.  Musculoskeletal: No gross deformities appreciated.  Neuro: Alert, age-appropriate.  Normal muscle tone.  Moving all extremities.  Skin: No rash, bruises or lesions.   Labs      Review of testing specificallyThe EGD dated 10/31/2024 showed 1 cm hiatal hernia the stomach greater curvature lesser curvature appeared normal the duodenum also appeared normal there was slight irregularity noted in the esophagus and therefore biopsy was performed with recommendations to perhaps increase omeprazole to 40 mg if needed with discussion regarding lifestyle changes per report    Results of lab testing dated 12/24 showed cholesterol 225   HDL 75 glycerides 75 patient encouraged to continue weight watchers and reduce LDL    Vitamin B12 which can drop with PPI was reviewed and 618 and excellent    Iron level 96 and normal    CMP reviewed and normal kidney function normal liver function studies bilirubin up slightly possible [place] otherwise reassurance    CBC with differential indicated no evidence of anemia white count looks fine platelet count looks good excellent and    PSA elevated at 15.  Assessment/Plan   Problem List Items Addressed This Visit       Essential hypertension     Patient is also here for follow-up of hypertension.. Symptoms do not include headache confusion visual disturbance dizziness shortness of breath chest pain syncope or palpitations. Recent blood pressure patient has  been checking 120/60. By report there is good compliance with treatment. Pertinent medical history includes aneurysm   Stable and continue meds and discussed         Gastroesophageal reflux disease - Primary         Results of biopsy showed chronic carditis which I suspect is a typographical or audio error.  Most likely indicating chronic gastritis these findings are common associate with reflux effect medication effect especially anti-inflammatory medications recommendation would be to continue omeprazole either 20 or 40 mg to control symptoms no evidence of Neff's noted per biopsy report and reassurance stable at the continue meds and discussed         Abdominal aortic aneurysm (AAA) without rupture (CMS-[facility])     Results of ultrasound dated 10/30/2024 showed prominence of the iliac artery with dilatation of the infrarenal abdominal aorta measuring 3.3 cm.  Will continue to monitor once again reassured..  Repeat ultrasound in 1 year good monitoring of blood pressure         Elevated PSA, between 10 and less than 20 ng/ml     Discussed with patient recommendation would be to get a evaluation with urology possible biopsy of the prostate.  Referral placed  in chart         Relevant Orders    Referral to Urology

## 2025-01-08 NOTE — ASSESSMENT & PLAN NOTE
Discussed with patient recommendation would be to get a evaluation with urology possible biopsy of the prostate.  Referral placed in chart

## 2025-01-08 NOTE — ASSESSMENT & PLAN NOTE
Results of biopsy showed chronic carditis which I suspect is a typographical or audio error.  Most likely indicating chronic gastritis these findings are common associate with reflux effect medication effect especially anti-inflammatory medications recommendation would be to continue omeprazole either 20 or 40 mg to control symptoms no evidence of Neff's noted per biopsy report and reassurance stable at the [facility] meds and discussed

## 2025-01-08 NOTE — ASSESSMENT & PLAN NOTE
Results of ultrasound dated 10/30/2024 showed prominence of the iliac artery with dilatation of the infrarenal abdominal aorta measuring 3.3 cm.  Will continue to monitor once again reassured..  Repeat ultrasound in 1 year good monitoring of blood pressure

## 2025-01-08 NOTE — ASSESSMENT & PLAN NOTE
Patient is also here for follow-up of hypertension.. Symptoms do not include headache confusion visual disturbance dizziness shortness of breath chest pain syncope or palpitations. Recent blood pressure patient has  been checking 120/60. By report there is good compliance with treatment. Pertinent medical history includes aneurysm   Stable and continue meds and discussed

## 2025-07-10 ENCOUNTER — APPOINTMENT (OUTPATIENT)
Dept: PRIMARY CARE | Facility: CLINIC | Age: 70
End: 2025-07-10
Payer: MEDICARE